# Patient Record
Sex: FEMALE | Race: WHITE | HISPANIC OR LATINO | URBAN - METROPOLITAN AREA
[De-identification: names, ages, dates, MRNs, and addresses within clinical notes are randomized per-mention and may not be internally consistent; named-entity substitution may affect disease eponyms.]

---

## 2021-01-20 ENCOUNTER — IMMUNIZATION (OUTPATIENT)
Dept: VACCINE CLINIC | Facility: HOSPITAL | Age: 56
End: 2021-01-20

## 2021-01-20 PROCEDURE — 91300 HC SARSCOV02 VAC 30MCG/0.3ML IM: CPT | Performed by: INTERNAL MEDICINE

## 2021-01-20 PROCEDURE — 0001A: CPT | Performed by: INTERNAL MEDICINE

## 2021-02-10 ENCOUNTER — IMMUNIZATION (OUTPATIENT)
Dept: VACCINE CLINIC | Facility: HOSPITAL | Age: 56
End: 2021-02-10

## 2021-02-10 PROCEDURE — 91300 HC SARSCOV02 VAC 30MCG/0.3ML IM: CPT | Performed by: INTERNAL MEDICINE

## 2021-02-10 PROCEDURE — 0002A: CPT | Performed by: INTERNAL MEDICINE

## 2023-05-08 ENCOUNTER — TRANSCRIBE ORDERS (OUTPATIENT)
Dept: ADMINISTRATIVE | Facility: HOSPITAL | Age: 58
End: 2023-05-08
Payer: OTHER GOVERNMENT

## 2023-05-08 DIAGNOSIS — K82.9 GALLBLADDER DISEASE: Primary | ICD-10-CM

## 2023-05-15 ENCOUNTER — HOSPITAL ENCOUNTER (OUTPATIENT)
Dept: NUCLEAR MEDICINE | Facility: HOSPITAL | Age: 58
Discharge: HOME | End: 2023-05-15
Payer: OTHER GOVERNMENT

## 2023-05-15 DIAGNOSIS — K82.9 GALLBLADDER DISEASE: ICD-10-CM

## 2023-05-15 PROCEDURE — 0 TECHNETIUM TC 99M MEBROFENIN KIT: Performed by: FAMILY MEDICINE

## 2023-05-15 PROCEDURE — A9537 TC99M MEBROFENIN: HCPCS | Performed by: FAMILY MEDICINE

## 2023-05-15 PROCEDURE — 78227 HEPATOBIL SYST IMAGE W/DRUG: CPT

## 2023-05-15 PROCEDURE — 25010000002 SINCALIDE PER 5 MCG: Performed by: FAMILY MEDICINE

## 2023-05-15 RX ORDER — KIT FOR THE PREPARATION OF TECHNETIUM TC 99M MEBROFENIN 45 MG/10ML
1 INJECTION, POWDER, LYOPHILIZED, FOR SOLUTION INTRAVENOUS
Status: COMPLETED | OUTPATIENT
Start: 2023-05-15 | End: 2023-05-15

## 2023-05-15 RX ADMIN — SODIUM CHLORIDE 1.6 MCG: 9 INJECTION, SOLUTION INTRAVENOUS at 11:31

## 2023-05-15 RX ADMIN — MEBROFENIN 1 DOSE: 45 INJECTION, POWDER, LYOPHILIZED, FOR SOLUTION INTRAVENOUS at 10:20

## 2024-07-08 ENCOUNTER — OFFICE VISIT (OUTPATIENT)
Dept: INTERNAL MEDICINE | Facility: CLINIC | Age: 59
End: 2024-07-08
Payer: OTHER GOVERNMENT

## 2024-07-08 VITALS
HEART RATE: 82 BPM | BODY MASS INDEX: 33.13 KG/M2 | HEIGHT: 62 IN | OXYGEN SATURATION: 98 % | TEMPERATURE: 97.8 F | DIASTOLIC BLOOD PRESSURE: 74 MMHG | WEIGHT: 180 LBS | SYSTOLIC BLOOD PRESSURE: 122 MMHG

## 2024-07-08 DIAGNOSIS — M77.12 LATERAL EPICONDYLITIS OF LEFT ELBOW: ICD-10-CM

## 2024-07-08 DIAGNOSIS — E11.9 TYPE 2 DIABETES MELLITUS WITHOUT COMPLICATION, WITHOUT LONG-TERM CURRENT USE OF INSULIN: Primary | ICD-10-CM

## 2024-07-08 DIAGNOSIS — E78.5 HYPERLIPIDEMIA, UNSPECIFIED HYPERLIPIDEMIA TYPE: ICD-10-CM

## 2024-07-08 DIAGNOSIS — Z76.89 ENCOUNTER TO ESTABLISH CARE: ICD-10-CM

## 2024-07-08 DIAGNOSIS — E66.9 OBESITY (BMI 30-39.9): ICD-10-CM

## 2024-07-08 LAB
EXPIRATION DATE: ABNORMAL
HBA1C MFR BLD: 8.3 % (ref 4.5–5.7)
Lab: ABNORMAL

## 2024-07-08 PROCEDURE — 83036 HEMOGLOBIN GLYCOSYLATED A1C: CPT | Performed by: NURSE PRACTITIONER

## 2024-07-08 PROCEDURE — 99203 OFFICE O/P NEW LOW 30 MIN: CPT | Performed by: NURSE PRACTITIONER

## 2024-07-08 RX ORDER — ORAL SEMAGLUTIDE 7 MG/1
7 TABLET ORAL DAILY
Qty: 30 TABLET | Refills: 3 | Status: SHIPPED | OUTPATIENT
Start: 2024-07-08

## 2024-07-08 RX ORDER — DULAGLUTIDE 0.75 MG/.5ML
0.75 INJECTION, SOLUTION SUBCUTANEOUS WEEKLY
COMMUNITY
Start: 2024-07-05 | End: 2024-07-08

## 2024-07-08 RX ORDER — ROSUVASTATIN CALCIUM 20 MG/1
20 TABLET, COATED ORAL DAILY
COMMUNITY
Start: 2023-08-16 | End: 2024-07-09 | Stop reason: SDUPTHER

## 2024-07-08 NOTE — PROGRESS NOTES
Chief Complaint  Establish Care, Diabetes (States that she is on trulicity for diabetes, this is the only medication that she takes as she should. Pt states that old provider had her on a Blood pressure medication as part of the diabetes protocol (?). States that this medication made her low pressure lower, and she couldn't take. This med doesn't pull from anywhere and pt did not know name. ), and Elbow Injury (States that she thinks this could be tennis elbow, states that this is the L. Arm near elbow and down to forearm. States this is x one month, maybe 2 months. Thinks from work or throwing Frisbee for dog. )    Subjective        Aparna Gonzalez presents to [facility]  History of Present Illness  Here to establish care.     She is a former patient of Dr. Laurie Ayala, with last office visit on 06/05/2023.     Type 2 DM: continues to take Trulicy, was taking lisinopril to help with kidneys but stopped taking it due to making her too dizzy. Last [id number]=02.1 on 05/12/2023. She is taking Trulicity 0.75 mg/mL SQ once per week. Today, her hemoglobin A1c=8.3. She does not like to inject herself, would rather have a pill form.     Hyperlipidemia: was taking rosuvastatin (CRESTOR) 20 mg once daily prescribed by historical provider, but not taking it now for unspecified reasons. Last fasting lipid panel from 05/12/2023 showed total cholesterol = 208, HDL = 53, LDL = 135, triglycerides 103. TSH normal. She has not been taking it because she did not know why it was prescribed.     Left elbow pain:  chronic for about 1-2 months. She is right-handed, but she does use her left arm for many things. She has tried a brace, does not help. She bought a TENS unit for about 2 weeks and is providing minimal relief. Cannot take NSAIDs due to s/p gastric bypass surgery.     Obesity: she is s/p gastric bypass surgery in 2014. Her diabetes was in remission,but cannot say when her blood glucose started to  "increase. Has lost about 150 lbs initially, but has gained back about 50 lbs.       Objective   Vital Signs:  /74 (BP Location: Right arm, Patient Position: Sitting, Cuff Size: Adult)   Pulse 82   Temp 97.8 °F (36.6 °C) (Infrared)   Ht 157.5 cm (62\")   Wt 81.6 kg (180 lb)   SpO2 98%   BMI 32.92 kg/m²   Estimated body mass index is 32.92 kg/m² as calculated from the following:    Height as of this encounter: 157.5 cm (62\").    Weight as of this encounter: 81.6 kg (180 lb).       BMI is >= 30 and <35. (Class 1 Obesity). The following options were offered after discussion;: exercise counseling/recommendations and nutrition counseling/recommendations       Physical Exam  Vitals reviewed.   Constitutional:       General: She is not in acute distress.     Appearance: Normal appearance. She is obese. She is not ill-appearing, toxic-appearing or diaphoretic.   Cardiovascular:      Rate and Rhythm: Normal rate and regular rhythm.   Pulmonary:      Effort: Pulmonary effort is normal.      Breath sounds: Normal breath sounds.   Musculoskeletal:        Arms:    Skin:     General: Skin is warm.   Neurological:      General: No focal deficit present.      Mental Status: She is alert and oriented to person, place, and time. Mental status is at baseline.   Psychiatric:         Mood and Affect: Mood normal.         Behavior: Behavior normal.         Thought Content: Thought content normal.         Judgment: Judgment normal.        Result Review :                   Assessment and Plan   Patient is a very pleasant 58-year-old female with type 2 diabetes mellitus not currently well-controlled, GERD, obesity status post gastric bypass surgery, hyperlipidemia here to establish care.    Results for orders placed or performed in visit on 07/08/24   POC Glycosylated Hemoglobin (Hb A1C)    Specimen: Blood   Result Value Ref Range    Hemoglobin A1C 8.3 (A) 4.5 - 5.7 %    Lot Number [id number]     Expiration Date 4,302,026  "           Diagnoses and all orders for this visit:    1. Type 2 diabetes mellitus without complication, without long-term current use of insulin (Primary)  Comments:  Change medication to oral semaglutide (Rybelsus) per patient preference  Orders:  -     POC Glycosylated Hemoglobin (Hb A1C)  -     Semaglutide (Rybelsus) 7 MG tablet; Take 7 mg by mouth Daily.  Dispense: 30 tablet; Refill: 3    2. Encounter to establish care    3. Hyperlipidemia, unspecified hyperlipidemia type  Comments:  Continue to take rosuvastatin (Crestor) 20 mg p.o. daily.  Will check lipid panel at next annual physical exam.    4. Lateral epicondylitis of left elbow  Comments:  Declines physical therapy at this time.  Continue TENS unit and brace area as much as possible. Follow up PRN if no improvement    5. Obesity (BMI 30-39.9)  Comments:  Recommend speaking with registered dietician. Will discuss further at annual physical exam.             Follow Up   Return in about 1 month (around 8/8/2024) for Annual physical.  Patient was given instructions and counseling regarding her condition or for health maintenance advice. Please see specific information pulled into the AVS if appropriate.

## 2024-07-09 RX ORDER — ROSUVASTATIN CALCIUM 20 MG/1
20 TABLET, COATED ORAL DAILY
Qty: 30 TABLET | Refills: 11 | Status: SHIPPED | OUTPATIENT
Start: 2024-07-09 | End: 2025-07-09

## 2024-07-09 NOTE — TELEPHONE ENCOUNTER
Caller: Aparna Gonzalez    Relationship: Self    Best call back number: [phone]    Requested Prescriptions:   Requested Prescriptions     Pending Prescriptions Disp Refills    rosuvastatin (CRESTOR) 20 MG tablet 30 tablet 11     Sig: Take 1 tablet by mouth Daily.        Pharm[facility] where request [facility] be sent: [id number] - [facility] - [address]  AT [address] - [phone]  - [phone] FX     Last office visit with prescribing clinician: 2024   Last telemedicine visit with prescribing clinician: Visit date not found   Next office visit with prescribing clinician: 2024     Additional details provided by patient: SHE HAS SOME OF THIS, BUT THINKS THAT HE MAY BE .     Does the patient have less than a 3 day supply:  [x] Yes  [] No        Mateo Menchaca Rep   24 08:34 EDT

## 2024-07-12 ENCOUNTER — PATIENT ROUNDING (BHMG ONLY) (OUTPATIENT)
Dept: INTERNAL MEDICINE | Facility: CLINIC | Age: 59
End: 2024-07-12
Payer: OTHER GOVERNMENT

## 2024-07-12 NOTE — PROGRESS NOTES
A My-Chart message has been sent to the patient for Patient Rounding with [facility].

## 2024-07-17 ENCOUNTER — TELEPHONE (OUTPATIENT)
Dept: INTERNAL MEDICINE | Facility: CLINIC | Age: 59
End: 2024-07-17

## 2024-07-17 NOTE — TELEPHONE ENCOUNTER
I do not show a glucose monitor in your medication list. At this time, it is not recommended you have a home glucose monitoring system unless you are on insulin. We will recheck your hemoglobin A1c in August.

## 2024-07-17 NOTE — TELEPHONE ENCOUNTER
Caller: LisaAparna    Relationship: Self    Best call back number: [phone]     What medication are you requesting: GLUCOSE MONITOR, TEST STRIPS, LANCETS      If a prescription is needed, what is your preferred pharmacy and phone number:    [id number] - [id number] - [address]  AT [place] [address] & [address] - [phone]  - [phone]  [phone]     Additional notes:    PATIENTS OLD GLUCOSE MONITOR FELL DOWN SOME STAIRS AND IS NOT WORKING. PATIENT WILL NEED A NEW ONE PLEASE. ALSO, THE TEST STRIPS AND LANCETS THAT GO WITH THE MONITOR.

## 2024-07-18 NOTE — TELEPHONE ENCOUNTER
Pt states she had  a glucometer (freestyle) it was started at [place],  not a CGM.  She dropped it down the stairs so she needs a new one.  Especially since startin Rybelsus.

## 2024-07-19 DIAGNOSIS — E11.9 TYPE 2 DIABETES MELLITUS WITHOUT COMPLICATION, WITHOUT LONG-TERM CURRENT USE OF INSULIN: Primary | ICD-10-CM

## 2024-07-23 NOTE — TELEPHONE ENCOUNTER
Patient calling today stating that since starting Rybelsus per Marycruz (she was on Trulicity) that she has not felt well since. States that she feels like it is her blood sugar but that she has no way to check, since she dropped her machine down the stairs.    I asked pt which machine she has, pt is not sure, and not able to look.      Pt states that she doesn't know which freestyle andi system that she had previously, but that she needs something she doesn't care what it is, as a replacement. She will need the whole kit (machine, strips, and lancets).     Please send in as able.

## 2024-07-24 NOTE — TELEPHONE ENCOUNTER
PATIENT CALLED BECAUSE SHE HAS BEEN TRYING TO GET A NEW MACHINE SINCE 7/17/2024 BUT SHE HASN'T HEARD BACK YET.     SHE FEARS HER BLOOD SUGAR MAY BE HIGH BUT SHE CAN'T READ IT.    SHE SAYS SHE HAS A FREESTYLE MACHINE

## 2024-07-25 ENCOUNTER — OFFICE VISIT (OUTPATIENT)
Dept: INTERNAL MEDICINE | Facility: CLINIC | Age: 59
End: 2024-07-25
Payer: OTHER GOVERNMENT

## 2024-07-25 VITALS
TEMPERATURE: 97.3 F | HEART RATE: 77 BPM | WEIGHT: 180 LBS | BODY MASS INDEX: 33.13 KG/M2 | DIASTOLIC BLOOD PRESSURE: 64 MMHG | HEIGHT: 62 IN | RESPIRATION RATE: 16 BRPM | SYSTOLIC BLOOD PRESSURE: 122 MMHG | OXYGEN SATURATION: 97 %

## 2024-07-25 DIAGNOSIS — R20.2 PARESTHESIA OF FOOT, BILATERAL: ICD-10-CM

## 2024-07-25 DIAGNOSIS — E11.9 TYPE 2 DIABETES MELLITUS WITHOUT COMPLICATION, WITHOUT LONG-TERM CURRENT USE OF INSULIN: Primary | ICD-10-CM

## 2024-07-25 DIAGNOSIS — R68.89 FEELING UNWELL: ICD-10-CM

## 2024-07-25 LAB — GLUCOSE BLDC GLUCOMTR-MCNC: 197 MG/DL (ref 70–130)

## 2024-07-25 PROCEDURE — 82948 REAGENT STRIP/BLOOD GLUCOSE: CPT | Performed by: NURSE PRACTITIONER

## 2024-07-25 PROCEDURE — 99214 OFFICE O/P EST MOD 30 MIN: CPT | Performed by: NURSE PRACTITIONER

## 2024-07-25 RX ORDER — ORAL SEMAGLUTIDE 14 MG/1
14 TABLET ORAL DAILY
Qty: 30 TABLET | Refills: 4 | Status: SHIPPED | OUTPATIENT
Start: 2024-07-25 | End: 2024-07-25

## 2024-07-25 RX ORDER — BLOOD-GLUCOSE METER
1 KIT MISCELLANEOUS AS NEEDED
Qty: 1 EACH | Refills: 0 | Status: SHIPPED | OUTPATIENT
Start: 2024-07-25

## 2024-07-25 NOTE — PROGRESS NOTES
Chief Complaint  Blood Sugar Problem (States that after she eats that she has sugar crashes and that she doesn't feel well. But hasn't been able to check with a broken machine. Pt feels she should be checking them, and feels maybe med is working too well or not well enough. )    Subjective        Aparna Gonzalez presents to [facility]  History of Present Illness  She was last seen in our office on July 08.2024 to establish care and for further evaluation diabetes mellitus.  At that last office visit, she stated that she was taking Trulicity and lisinopril to help with kidneys but stopped taking it due to making her too dizzy.  Her hemoglobin A1c in May 2003, which is the last time she saw her primary care provider, was 11.1%.  She is taking Trulicity 0.75 mg/mL SQ once per week.  At last office visit hemoglobin A1c was 8.3%.  She stated that she did not like to inject herself and requested to be switched to an oral form of semaglutide.    She was switched to semaglutide (Rybelsus) 7 mg to be taken by mouth once daily.  She was provided glucometer by historical provider specified reasons.  She accidentally broke the glucometer, does not know which one that she was prescribed.  She requested a new one.  However, because she is not on insulin, her insurance does not cover a glucometer.     She has not tolerated metformin in the past due to diarrhea. She cannot remember whether or not extended release, but probably not XL due to s/p bariatric surgery.     She is not feeling well, fatigued. Feels sick when she eats. No abdomen pain. This has been occurring 2 times in the past week and thinks may be due to Rybesus.   She is urinating well, bowel moves are normal pattern. No hematochezia or melena.   Diabetes  She has type 2 diabetes mellitus. No MedicAlert identification noted. The initial diagnosis of diabetes was made 2000 years ago. Pertinent negatives for hypoglycemia include no  "confusion, headaches, speech difficulty, sweats or tremors. Associated symptoms include foot paresthesias. Pertinent negatives for diabetes include no blurred vision, no foot ulcerations, no polydipsia, no polyuria and no weight loss. Hypoglycemia complications include required assistance. Pertinent negatives for hypoglycemia complications include no blackouts, no hospitalization, no nocturnal hypoglycemia and no required glucagon injection. She is compliant with treatment all of the time. She is following a generally healthy diet. Meal planning includes avoidance of concentrated sweets and low carbohydrate diet. She participates in exercise intermittently. She monitors blood glucose at home 1-2 x per day. Her highest blood glucose is 180-200 mg/dl. She does not see a podiatrist. Eye exam is current.   Blood Sugar Problem  Pertinent negatives include no headaches.       Objective   Vital Signs:  /64 (BP Location: Right arm, Patient Position: Sitting, Cuff Size: Adult)   Pulse 77   Temp 97.3 °F (36.3 °C) (Infrared)   Resp 16   Ht 157.5 cm (62\")   Wt 81.6 kg (180 lb)   SpO2 97%   BMI 32.92 kg/m²   Estimated body mass index is 32.92 kg/m² as calculated from the following:    Height as of this encounter: 157.5 cm (62\").    Weight as of this encounter: 81.6 kg (180 lb).               Physical Exam  Vitals reviewed.   Constitutional:       General: She is not in acute distress.     Appearance: Normal appearance. She is obese. She is not ill-appearing, toxic-appearing or diaphoretic.   Cardiovascular:      Rate and Rhythm: Normal rate and regular rhythm.   Pulmonary:      Effort: Pulmonary effort is normal.      Breath sounds: Normal breath sounds.   Neurological:      General: No focal deficit present.      Mental Status: She is alert and oriented to person, place, and time. Mental status is at baseline.        Result Review :                   Assessment and Plan   Patient is a pleasant 58-year-old female " with type 2 diabetes mellitus not currently well-controlled, GERD, obesity status post gastric bypass surgery, hyperlipidemia here for follow up initiating oral semaglutide (Rybelsus).         Diagnoses and all orders for this visit:    1. Type 2 diabetes mellitus without complication, without long-term current use of insulin (Primary)  -     POCT Glucose  -     Discontinue: Semaglutide (Rybelsus) 14 MG tablet; Take 1 tablet by mouth Daily.  Dispense: 30 tablet; Refill: 4  -     glucose monitor monitoring kit; Use 1 each As Needed (Use to check blood sugar up to two times daily). Please dispense covered glucometer with set of lancets and needles at a reasonable quantity.  Dispense: 1 each; Refill: 0  -     Ambulatory Referral to Endocrinology    2. Feeling unwell  -     CBC & Differential  -     Comprehensive Metabolic Panel  -     Lipid Panel  -     Thyroid Panel With TSH  -     Microalbumin / Creatinine Urine Ratio - Urine, Clean Catch  -     Vitamin D,25-Hydroxy  -     Vitamin B12    3. Paresthesia of foot, bilateral    Reviewed different treatment options for type 2 DM.  May not feel well due to staring 7 mg Rybelsus as opposed to starting 3 mg.  Does not wish to take SGLT2i due to concern over affecting kidneys.   She declines restarting Trulicity due to not wanting to inject herself.   She would like to continue Ryblesus while waiting to see endocrinology.   Will continue to monitor her response to Rybelsus prior to getting in to see endocrinology.        Follow Up   Return in about 3 months (around 10/25/2024) for Recheck.  Patient was given instructions and counseling regarding her condition or for health maintenance advice. Please see specific information pulled into the AVS if appropriate.         Answers submitted by the patient for this visit:  Primary Reason for Visit (Submitted on 7/25/2024)  What is the primary reason for your visit?: Diabetes  Diabetes Questionnaire (Submitted on 7/25/2024)  Chief  Complaint: Diabetes problem  Below 70: never

## 2024-07-26 LAB
25(OH)D3+25(OH)D2 SERPL-MCNC: 21.4 NG/ML (ref 30–100)
ALBUMIN SERPL-MCNC: 4.1 G/DL (ref 3.8–4.9)
ALBUMIN/CREAT UR: 6 MG/G CREAT (ref 0–29)
ALP SERPL-CCNC: 100 IU/L (ref 44–121)
ALT SERPL-CCNC: 21 IU/L (ref 0–32)
AST SERPL-CCNC: 18 IU/L (ref 0–40)
BASOPHILS # BLD AUTO: 0 X10E3/UL (ref 0–0.2)
BASOPHILS NFR BLD AUTO: 0 %
BILIRUB SERPL-MCNC: 0.3 MG/DL (ref 0–1.2)
BUN SERPL-MCNC: 17 MG/DL (ref 6–24)
BUN/CREAT SERPL: 23 (ref 9–23)
CALCIUM SERPL-MCNC: 9.2 MG/DL (ref 8.7–10.2)
CHLORIDE SERPL-SCNC: 101 MMOL/L (ref 96–106)
CHOLEST SERPL-MCNC: 145 MG/DL (ref 100–199)
CO2 SERPL-SCNC: 25 MMOL/L (ref 20–29)
CREAT SERPL-MCNC: 0.73 MG/DL (ref 0.57–1)
CREAT UR-MCNC: 265.6 MG/DL
EGFRCR SERPLBLD CKD-EPI 2021: 95 ML/MIN/1.73
EOSINOPHIL # BLD AUTO: 0.1 X10E3/UL (ref 0–0.4)
EOSINOPHIL NFR BLD AUTO: 1 %
ERYTHROCYTE [DISTWIDTH] IN BLOOD BY AUTOMATED COUNT: 12.7 % (ref 11.7–15.4)
FT4I SERPL CALC-MCNC: 2.1 (ref 1.2–4.9)
GLOBULIN SER CALC-MCNC: 2.9 G/DL (ref 1.5–4.5)
GLUCOSE SERPL-MCNC: 228 MG/DL (ref 70–99)
HCT VFR BLD AUTO: 38.1 % (ref 34–46.6)
HDLC SERPL-MCNC: 56 MG/DL
HGB BLD-MCNC: 12.4 G/DL (ref 11.1–15.9)
IMM GRANULOCYTES # BLD AUTO: 0 X10E3/UL (ref 0–0.1)
IMM GRANULOCYTES NFR BLD AUTO: 0 %
LDLC SERPL CALC-MCNC: 69 MG/DL (ref 0–99)
LYMPHOCYTES # BLD AUTO: 2.3 X10E3/UL (ref 0.7–3.1)
LYMPHOCYTES NFR BLD AUTO: 27 %
MCH RBC QN AUTO: 28.1 PG (ref 26.6–33)
MCHC RBC AUTO-ENTMCNC: 32.5 G/DL (ref 31.5–35.7)
MCV RBC AUTO: 86 FL (ref 79–97)
MICROALBUMIN UR-MCNC: 15.8 UG/ML
MONOCYTES # BLD AUTO: 0.6 X10E3/UL (ref 0.1–0.9)
MONOCYTES NFR BLD AUTO: 7 %
NEUTROPHILS # BLD AUTO: 5.5 X10E3/UL (ref 1.4–7)
NEUTROPHILS NFR BLD AUTO: 65 %
PLATELET # BLD AUTO: 283 X10E3/UL (ref 150–450)
POTASSIUM SERPL-SCNC: 4.4 MMOL/L (ref 3.5–5.2)
PROT SERPL-MCNC: 7 G/DL (ref 6–8.5)
RBC # BLD AUTO: 4.41 X10E6/UL (ref 3.77–5.28)
SODIUM SERPL-SCNC: 138 MMOL/L (ref 134–144)
T3RU NFR SERPL: 26 % (ref 24–39)
T4 SERPL-MCNC: 7.9 UG/DL (ref 4.5–12)
TRIGL SERPL-MCNC: 113 MG/DL (ref 0–149)
TSH SERPL DL<=0.005 MIU/L-ACNC: 2.23 UIU/ML (ref 0.45–4.5)
VIT B12 SERPL-MCNC: 232 PG/ML (ref 232–1245)
VLDLC SERPL CALC-MCNC: 20 MG/DL (ref 5–40)
WBC # BLD AUTO: 8.5 X10E3/UL (ref 3.4–10.8)

## 2024-08-07 ENCOUNTER — OFFICE VISIT (OUTPATIENT)
Dept: ENDOCRINOLOGY | Age: 59
End: 2024-08-07
Payer: OTHER GOVERNMENT

## 2024-08-07 VITALS
BODY MASS INDEX: 33.27 KG/M2 | SYSTOLIC BLOOD PRESSURE: 134 MMHG | DIASTOLIC BLOOD PRESSURE: 84 MMHG | HEART RATE: 89 BPM | HEIGHT: 62 IN | WEIGHT: 180.8 LBS | OXYGEN SATURATION: 97 %

## 2024-08-07 DIAGNOSIS — E11.9 TYPE 2 DIABETES MELLITUS WITHOUT COMPLICATION, WITHOUT LONG-TERM CURRENT USE OF INSULIN: Primary | ICD-10-CM

## 2024-08-07 RX ORDER — BLOOD GLUCOSE CONTROL HIGH,LOW
1 EACH MISCELLANEOUS ONCE
Qty: 1 EACH | Refills: 0 | Status: SHIPPED | OUTPATIENT
Start: 2024-08-07 | End: 2024-08-07

## 2024-08-07 RX ORDER — LANCETS
EACH MISCELLANEOUS
Qty: 100 EACH | Refills: 3 | Status: SHIPPED | OUTPATIENT
Start: 2024-08-07

## 2024-08-07 RX ORDER — BLOOD SUGAR DIAGNOSTIC
STRIP MISCELLANEOUS
Qty: 100 EACH | Refills: 3 | Status: SHIPPED | OUTPATIENT
Start: 2024-08-07

## 2024-08-07 RX ORDER — ORAL SEMAGLUTIDE 14 MG/1
TABLET ORAL
COMMUNITY
Start: 2024-07-24

## 2024-08-07 NOTE — PROGRESS NOTES
Referring provider: SALMA Johnson     Chief complaint/Reason for consult: T2DM    HPI:   - 58 year old female here for management of diabetes mellitus type 2  - Has had diabetes since early 2000's  - Had bariatric surgery several years ago  - No known complications to date  - Is currently taking Rybelsus 14 mg daily which was just increased  - She has not been checking her BG because she does not have testing supplies      The following portions of the patient's history were reviewed and updated as appropriate: allergies, current medications, past family history, past medical history, past social history, past surgical history, and problem list.      Objective     Vitals:    08/07/24 1154   BP: 134/84   Pulse: 89   SpO2: 97%        Physical Exam  Vitals reviewed.   Constitutional:       Appearance: Normal appearance.   Pulmonary:      Effort: Pulmonary effort is normal. No respiratory distress.   Neurological:      Mental Status: She is alert.   Psychiatric:         Mood and Affect: Mood normal.         Behavior: Behavior normal.         Thought Content: Thought content normal.         Judgment: Judgment normal.       Assessment & Plan   T2DM, uncontrolled due to hyperglycemia  - A1c in July was 8.3%  - On Rybelsus 14 mg daily  - Prescribed testing supplies and asked her to contact me with BG numbers    - Return to clinic in 3 months

## 2024-08-09 ENCOUNTER — PATIENT ROUNDING (BHMG ONLY) (OUTPATIENT)
Dept: ENDOCRINOLOGY | Age: 59
End: 2024-08-09
Payer: OTHER GOVERNMENT

## 2024-08-14 ENCOUNTER — OFFICE VISIT (OUTPATIENT)
Dept: INTERNAL MEDICINE | Facility: CLINIC | Age: 59
End: 2024-08-14
Payer: OTHER GOVERNMENT

## 2024-08-14 VITALS
RESPIRATION RATE: 16 BRPM | SYSTOLIC BLOOD PRESSURE: 118 MMHG | BODY MASS INDEX: 33.23 KG/M2 | WEIGHT: 180.6 LBS | TEMPERATURE: 95.9 F | HEART RATE: 70 BPM | DIASTOLIC BLOOD PRESSURE: 64 MMHG | OXYGEN SATURATION: 96 % | HEIGHT: 62 IN

## 2024-08-14 DIAGNOSIS — E66.9 OBESITY (BMI 30-39.9): ICD-10-CM

## 2024-08-14 DIAGNOSIS — R20.2 PARESTHESIA OF LEFT FOOT: ICD-10-CM

## 2024-08-14 DIAGNOSIS — E11.9 TYPE 2 DIABETES MELLITUS WITHOUT COMPLICATION, WITHOUT LONG-TERM CURRENT USE OF INSULIN: ICD-10-CM

## 2024-08-14 DIAGNOSIS — Z00.00 ROUTINE PHYSICAL EXAMINATION: Primary | ICD-10-CM

## 2024-08-14 PROCEDURE — 99396 PREV VISIT EST AGE 40-64: CPT | Performed by: NURSE PRACTITIONER

## 2024-08-14 NOTE — PROGRESS NOTES
"Chief Complaint  Annual Exam (Pt is fasting this am, last glucose reading this am was 197 mg/DL./)    Subjective        Aparna Gonzalez presents to [facility]  History of Present Illness  Type 2 diabetes mellitus: She recently became established with Dr. Yaniv Cortez, endocrinology.  Last office visit was August 7, 2024.  He advised her to stay on Rybelsus 14 mg p.o. daily and prescribe testing supplies with instructions to follow with him 3 months. She is still running in the 190's and 200's. She has not seen a registered dietician.       Last lab work was performed on July 25, 2024, including vitamin B12, vitamin D, microalbumin\creatinine, thyroid panel with TSH, lipid panel, CMP and CBC. Vitamin D level = 21.4 was recommended to take a daily vitamin D supplement 600 to 800 international units.    Obesity: She is status-post gastric bypass. She lost 150 lbs. She has gained back 50 lbs. Her lowest was 130 lbs after she had the gastric bypass surgery in [place] in 2014.  She walks daily, about 15,000 steps per day. Does not monitor her kcal input or output.       History is positive for unspecified kidney disease (mother), type 2 diabetes mellitus (father).  He is a former smoker with a pack year history 40.6 years.  She quit 1984.  She has never used tobacco products she drinks a minimal amount of alcohol.  She is status post hysterectomy.      Objective   Vital Signs:  /64 (BP Location: Left arm, Patient Position: Sitting, Cuff Size: Adult)   Pulse 70   Temp 95.9 °F (35.5 °C) (Infrared)   Resp 16   Ht 157.5 cm (62\")   Wt 81.9 kg (180 lb 9.6 oz)   SpO2 96%   BMI 33.03 kg/m²   Estimated body mass index is 33.03 kg/m² as calculated from the following:    Height as of this encounter: 157.5 cm (62\").    Weight as of this encounter: 81.9 kg (180 lb 9.6 oz).               Physical Exam  Vitals and nursing note reviewed.   Constitutional:       General: She is not in acute " distress.     Appearance: Normal appearance. She is obese. She is not ill-appearing, toxic-appearing or diaphoretic.   HENT:      Right Ear: Tympanic membrane, ear canal and external ear normal.      Left Ear: Tympanic membrane, ear canal and external ear normal.      Mouth/Throat:      Mouth: Mucous membranes are moist.      Pharynx: Oropharynx is clear.   Eyes:      Conjunctiva/sclera: Conjunctivae normal.      Pupils: Pupils are equal, round, and reactive to light.   Cardiovascular:      Rate and Rhythm: Normal rate and regular rhythm.      Pulses: Normal pulses.      Heart sounds: Normal heart sounds.   Pulmonary:      Effort: Pulmonary effort is normal.      Breath sounds: Normal breath sounds.   Abdominal:      General: Abdomen is flat. Bowel sounds are normal.      Palpations: Abdomen is soft.      Tenderness: There is no abdominal tenderness. There is no guarding.   Musculoskeletal:         General: Normal range of motion.   Feet:      Right foot:      Toenail Condition: Right toenails are normal.      Left foot:      Toenail Condition: Left toenails are normal.   Skin:     General: Skin is warm and dry.   Neurological:      General: No focal deficit present.      Mental Status: She is alert and oriented to person, place, and time. Mental status is at baseline.      Coordination: Coordination normal.      Gait: Gait normal.      Deep Tendon Reflexes: Reflexes normal.      Comments: Patient reports some mild paresthesia left great toe.    Psychiatric:         Mood and Affect: Mood normal.         Behavior: Behavior normal.         Thought Content: Thought content normal.         Judgment: Judgment normal.        Result Review :                   Assessment and Plan   Patient is a 58-year-old female with type 2 diabetes mellitus not currently well-controlled, GERD, obesity status post gastric bypass surgery, hyperlipidemia here for routine physical exam.        We discussed preventative care including age and  patient-appropriate immunizations and cancer screening. We also discussed the importance of exercise and a healthy diet. This is their annual preventative exam.        Diagnoses and all orders for this visit:    1. Routine physical examination (Primary)    2. Type 2 diabetes mellitus without complication, without long-term current use of insulin  Comments:  Keep appointment with Dr. Everton Cortez, endocrinology, 11/06/2024. Have diabetic foot exam performed on that date. Follow up with eyecare provider for eye exam.    3. Obesity (BMI 30-39.9)  Comments:  Discussed food diary. Advise to f/u with an RD with endocrinology for assistance managing diet.    4. Paresthesia of left foot  Comments:  Minor, acute. Recommend discussing with endocrinology and continue to monitor.             Follow Up   Return in about 6 months (around 2/14/2025) for Recheck.  Patient was given instructions and counseling regarding her condition or for health maintenance advice. Please see specific information pulled into the AVS if appropriate.

## 2024-09-26 DIAGNOSIS — E11.9 TYPE 2 DIABETES MELLITUS WITHOUT COMPLICATION, WITHOUT LONG-TERM CURRENT USE OF INSULIN: ICD-10-CM

## 2024-09-26 RX ORDER — ORAL SEMAGLUTIDE 14 MG/1
1 TABLET ORAL DAILY
Qty: 30 TABLET | Refills: 3 | Status: SHIPPED | OUTPATIENT
Start: 2024-09-26

## 2024-10-22 ENCOUNTER — OFFICE VISIT (OUTPATIENT)
Dept: INTERNAL MEDICINE | Facility: CLINIC | Age: 59
End: 2024-10-22
Payer: OTHER GOVERNMENT

## 2024-10-22 VITALS
BODY MASS INDEX: 32.94 KG/M2 | SYSTOLIC BLOOD PRESSURE: 118 MMHG | HEART RATE: 62 BPM | OXYGEN SATURATION: 96 % | HEIGHT: 62 IN | WEIGHT: 179 LBS | DIASTOLIC BLOOD PRESSURE: 74 MMHG

## 2024-10-22 DIAGNOSIS — M77.12 LATERAL EPICONDYLITIS OF LEFT ELBOW: Primary | ICD-10-CM

## 2024-10-22 PROCEDURE — 99213 OFFICE O/P EST LOW 20 MIN: CPT | Performed by: NURSE PRACTITIONER

## 2024-10-22 NOTE — PROGRESS NOTES
"Chief Complaint  Follow-up    Subjective        Aparna Gonzalez presents to [facility]  History of Present Illness  Was last seen in our office on August 14, 2024 for routine physical exam.    She cannot take NSAIDs. Has tried banding the forearm, and a TENS unit. This has been an issues for about 3 months. She is now having difficulty with her left . She has not been evaluated by sports medicine for this or had PT.       Objective   Vital Signs:  /74   Pulse 62   Ht 157.5 cm (62\")   Wt 81.2 kg (179 lb)   SpO2 96%   BMI 32.74 kg/m²   Estimated body mass index is 32.74 kg/m² as calculated from the following:    Height as of this encounter: 157.5 cm (62\").    Weight as of this encounter: 81.2 kg (179 lb).               Physical Exam  Vitals and nursing note reviewed.   Constitutional:       General: She is not in acute distress.     Appearance: Normal appearance. She is obese. She is not ill-appearing, toxic-appearing or diaphoretic.   Musculoskeletal:      Left elbow: Tenderness present in lateral epicondyle.      Left forearm: Tenderness present.      Left hand: Decreased strength (poor  strenght when compared with right hand.).   Neurological:      General: No focal deficit present.      Mental Status: She is alert and oriented to person, place, and time. Mental status is at baseline.        Result Review :                   Assessment and Plan   Patient is a 59-year-old female with type 2 diabetes mellitus not currently well-controlled, GERD, obesity status post gastric bypass surgery, hyperlipidemia who presents with about 3-month history left lateral epicondylitis not improving with conservative treatment.           Diagnoses and all orders for this visit:    1. Lateral epicondylitis of left elbow (Primary)  Comments:  Will refer to sports medicine for further evalution.  Orders:  -     Ambulatory Referral to Sports Medicine             Follow Up   Return if " symptoms worsen or fail to improve.  Patient was given instructions and counseling regarding her condition or for health maintenance advice. Please see specific information pulled into the AVS if appropriate.         Answers submitted by the patient for this visit:  Other (Submitted on 10/22/2024)  Please describe your symptoms.: General exam/ tennis elbow  Have you had these symptoms before?: Yes  How long have you been having these symptoms?: Greater than 2 weeks  Please list any medications you are currently taking for this condition.: Rybelsus  Primary Reason for Visit (Submitted on 10/22/2024)  What is the primary reason for your visit?: Problem Not Listed

## 2024-11-06 ENCOUNTER — OFFICE VISIT (OUTPATIENT)
Dept: ENDOCRINOLOGY | Age: 59
End: 2024-11-06
Payer: OTHER GOVERNMENT

## 2024-11-06 VITALS
OXYGEN SATURATION: 95 % | TEMPERATURE: 97.9 F | HEART RATE: 71 BPM | BODY MASS INDEX: 32.97 KG/M2 | SYSTOLIC BLOOD PRESSURE: 124 MMHG | WEIGHT: 179.2 LBS | DIASTOLIC BLOOD PRESSURE: 76 MMHG | HEIGHT: 62 IN

## 2024-11-06 DIAGNOSIS — E66.9 OBESITY (BMI 30-39.9): ICD-10-CM

## 2024-11-06 DIAGNOSIS — E11.9 TYPE 2 DIABETES MELLITUS WITHOUT COMPLICATION, WITHOUT LONG-TERM CURRENT USE OF INSULIN: Primary | ICD-10-CM

## 2024-11-06 LAB
ALBUMIN SERPL-MCNC: 4.1 G/DL (ref 3.5–5.2)
ALBUMIN/GLOB SERPL: 1.5 G/DL
ALP SERPL-CCNC: 86 U/L (ref 39–117)
ALT SERPL-CCNC: 18 U/L (ref 1–33)
AST SERPL-CCNC: 17 U/L (ref 1–32)
BILIRUB SERPL-MCNC: <0.2 MG/DL (ref 0–1.2)
BUN SERPL-MCNC: 18 MG/DL (ref 8–23)
BUN/CREAT SERPL: 26.1 (ref 7–25)
CALCIUM SERPL-MCNC: 9.1 MG/DL (ref 8.6–10.5)
CHLORIDE SERPL-SCNC: 103 MMOL/L (ref 98–107)
CO2 SERPL-SCNC: 27.9 MMOL/L (ref 22–29)
CREAT SERPL-MCNC: 0.69 MG/DL (ref 0.57–1)
EGFRCR SERPLBLD CKD-EPI 2021: 99.5 ML/MIN/1.73
GLOBULIN SER CALC-MCNC: 2.8 GM/DL
GLUCOSE SERPL-MCNC: 182 MG/DL (ref 65–99)
HBA1C MFR BLD: 8.9 % (ref 4.8–5.6)
POTASSIUM SERPL-SCNC: 4.4 MMOL/L (ref 3.5–5.2)
PROT SERPL-MCNC: 6.9 G/DL (ref 6–8.5)
SODIUM SERPL-SCNC: 138 MMOL/L (ref 136–145)

## 2024-11-06 RX ORDER — ORAL SEMAGLUTIDE 14 MG/1
1 TABLET ORAL DAILY
Qty: 90 TABLET | Refills: 3 | Status: SHIPPED | OUTPATIENT
Start: 2024-11-06

## 2024-11-06 NOTE — PROGRESS NOTES
Referring provider: No ref. provider found     Chief complaint/Reason for consult: T2DM    HPI:   - 59 year old female here for management of diabetes mellitus type 2  - Last seen in 8/2024  - Has had diabetes since early 2000's  - Had bariatric surgery several years ago  - No known complications to date  - Is currently taking Rybelsus 14 mg daily which was just increased      The following portions of the patient's history were reviewed and updated as appropriate: allergies, current medications, past family history, past medical history, past social history, past surgical history, and problem list.      Objective     Vitals:    11/06/24 0821   BP: 124/76   Pulse: 71   Temp: 97.9 °F (36.6 °C)   SpO2: 95%        Physical Exam  Vitals reviewed.   Constitutional:       Appearance: Normal appearance.   HENT:      Head: Normocephalic and atraumatic.   Eyes:      General: No scleral icterus.  Pulmonary:      Effort: Pulmonary effort is normal. No respiratory distress.   Neurological:      Mental Status: She is alert.      Gait: Gait normal.   Psychiatric:         Mood and Affect: Mood normal.         Behavior: Behavior normal.         Thought Content: Thought content normal.         Judgment: Judgment normal.         Assessment & Plan   T2DM, uncontrolled due to hyperglycemia  - Will check A1c  - On Rybelsus 14 mg daily    2. Obesity  - Dietary changes and exercise will help glycemic control       - Return to clinic in 4 months

## 2024-12-15 DIAGNOSIS — E11.9 TYPE 2 DIABETES MELLITUS WITHOUT COMPLICATION, WITHOUT LONG-TERM CURRENT USE OF INSULIN: ICD-10-CM

## 2024-12-16 RX ORDER — ORAL SEMAGLUTIDE 14 MG/1
1 TABLET ORAL DAILY
Qty: 90 TABLET | Refills: 3 | Status: SHIPPED | OUTPATIENT
Start: 2024-12-16

## 2024-12-16 NOTE — TELEPHONE ENCOUNTER
Rx Refill Note  Requested Prescriptions     Pending Prescriptions Disp Refills    Rybelsus 14 MG tablet [Pharmacy Med Name: RYBELSUS TABS 30'S 14MG] 90 tablet 3     Sig: TAKE 1 TABLET DAILY      Last office visit with prescribing clinician: 11/6/2024   Last telemedicine visit with prescribing clinician: Visit date not found   Next office visit with prescribing clinician: 3/17/2025                         Would you like a call back once the refill request has been completed: [] Yes [] No    If the office needs to give you a call back, can they leave a voicemail: [] Yes [] No    Sarah Loja MA  12/16/24, 07:49 EST

## 2024-12-22 DIAGNOSIS — E11.9 TYPE 2 DIABETES MELLITUS WITHOUT COMPLICATION, WITHOUT LONG-TERM CURRENT USE OF INSULIN: ICD-10-CM

## 2024-12-23 RX ORDER — ORAL SEMAGLUTIDE 14 MG/1
1 TABLET ORAL DAILY
Qty: 90 TABLET | Refills: 3 | OUTPATIENT
Start: 2024-12-23

## 2024-12-23 NOTE — TELEPHONE ENCOUNTER
Rx Refill Note  Requested Prescriptions     Pending Prescriptions Disp Refills    Rybelsus 14 MG tablet 90 tablet 3     Sig: Take 1 tablet by mouth Daily.      Last office visit with prescribing clinician: 11/6/2024   Last telemedicine visit with prescribing clinician: Visit date not found   Next office visit with prescribing clinician: 3/17/2025                         Would you like a call back once the refill request has been completed: [] Yes [] No    If the office needs to give you a call back, can they leave a voicemail: [] Yes [] No    Sarah Loja MA  12/23/24, 07:47 EST

## 2024-12-24 ENCOUNTER — PATIENT MESSAGE (OUTPATIENT)
Dept: ENDOCRINOLOGY | Age: 59
End: 2024-12-24
Payer: OTHER GOVERNMENT

## 2024-12-24 DIAGNOSIS — E11.9 TYPE 2 DIABETES MELLITUS WITHOUT COMPLICATION, WITHOUT LONG-TERM CURRENT USE OF INSULIN: ICD-10-CM

## 2024-12-26 ENCOUNTER — TELEPHONE (OUTPATIENT)
Dept: ENDOCRINOLOGY | Age: 59
End: 2024-12-26
Payer: OTHER GOVERNMENT

## 2024-12-26 RX ORDER — ROSUVASTATIN CALCIUM 20 MG/1
20 TABLET, COATED ORAL DAILY
Qty: 90 TABLET | Refills: 3 | Status: SHIPPED | OUTPATIENT
Start: 2024-12-26 | End: 2025-12-26

## 2024-12-26 NOTE — TELEPHONE ENCOUNTER
Rx Refill Note  Requested Prescriptions     Pending Prescriptions Disp Refills    rosuvastatin (CRESTOR) 20 MG tablet 30 tablet 11     Sig: Take 1 tablet by mouth Daily.    empagliflozin (Jardiance) 10 MG tablet tablet 90 tablet 1     Sig: Take 1 tablet by mouth Daily.      Last office visit with prescribing clinician: 11/6/2024   Last telemedicine visit with prescribing clinician: Visit date not found   Next office visit with prescribing clinician: 3/17/2025                         Would you like a call back once the refill request has been completed: [] Yes [] No    If the office needs to give you a call back, can they leave a voicemail: [] Yes [] No    Carmen Loja  12/26/24, 11:08 EST

## 2024-12-26 NOTE — TELEPHONE ENCOUNTER
Called and spoke with pt to let her know that I have faxed everything to [facility] for that PA. Pt then stated that she wanted me to call them because she only had 5 tablets left. I called and spoke with Kathy at [facility] and she stated that the Rybelsus had a lifetime approval and the pharmacy would need to call them to get a cost over ride. Called to inform pt. Pt voiced understanding and had no further questions.

## 2024-12-26 NOTE — TELEPHONE ENCOUNTER
Caller: Aparna Gonzalez    Relationship: Self    Best call back number: [phone]    What is the best time to reach you: ANYTIME, OKAY TO LVM    Who are you requesting to speak with (clinical staff, provider,  specific staff member): CLINICAL STAFF / PROVIDER     What was the call regarding: PT CALLED WONDERING STATUS OF PRIOR AUTH FOR PRESCRIPTION. PLEASE ADVISE.

## 2024-12-31 ENCOUNTER — TELEPHONE (OUTPATIENT)
Dept: ENDOCRINOLOGY | Age: 59
End: 2024-12-31
Payer: OTHER GOVERNMENT

## 2024-12-31 DIAGNOSIS — E11.9 TYPE 2 DIABETES MELLITUS WITHOUT COMPLICATION, WITHOUT LONG-TERM CURRENT USE OF INSULIN: ICD-10-CM

## 2024-12-31 RX ORDER — ORAL SEMAGLUTIDE 14 MG/1
1 TABLET ORAL DAILY
Qty: 90 TABLET | Refills: 3 | Status: SHIPPED | OUTPATIENT
Start: 2024-12-31

## 2024-12-31 RX ORDER — ORAL SEMAGLUTIDE 14 MG/1
1 TABLET ORAL DAILY
Qty: 90 TABLET | Refills: 3 | Status: CANCELLED | OUTPATIENT
Start: 2024-12-31

## 2024-12-31 NOTE — TELEPHONE ENCOUNTER
Caller: Lisa Aparna Ortiz    Relationship: Self    Best call back number: Rybelsus 14 MG tablet-     Requested Prescriptions: [phone]  Requested Prescriptions      No prescriptions requested or ordered in this encounter        Pharmacy where request should be sent:  [id number] - [place], [place] - [address]  AT [address] - [phone]  - [phone] [address]   [facility] [id number]      Last office visit with prescribing clinician: 11/6/2024   Last telemedicine visit with prescribing clinician: Visit date not found   Next office visit with prescribing clinician: 3/17/2025     Additional details provided by patient: Rybelsus 14 MG tablet- 30 SUPPLY [employer]OSPER [employer]. PT RUNS OUT OF MEDICATION TOMORROW    Does the patient have less than a 3 day supply:  [x] Yes  [] No    Would you like a call back once the refill request has been completed: [x] Yes [] No    If the office needs to give you a call back, can they leave a voicemail: [x] Yes [] No    Mtaeo Ivey Rep   12/31/24 08:24 EST

## 2025-02-18 ENCOUNTER — OFFICE VISIT (OUTPATIENT)
Dept: INTERNAL MEDICINE | Facility: CLINIC | Age: 60
End: 2025-02-18
Payer: OTHER GOVERNMENT

## 2025-02-18 VITALS
HEIGHT: 62 IN | OXYGEN SATURATION: 98 % | HEART RATE: 67 BPM | DIASTOLIC BLOOD PRESSURE: 60 MMHG | SYSTOLIC BLOOD PRESSURE: 110 MMHG | WEIGHT: 175 LBS | BODY MASS INDEX: 32.2 KG/M2

## 2025-02-18 DIAGNOSIS — E66.9 OBESITY (BMI 30-39.9): Primary | ICD-10-CM

## 2025-02-18 PROCEDURE — 99213 OFFICE O/P EST LOW 20 MIN: CPT | Performed by: NURSE PRACTITIONER

## 2025-02-18 NOTE — PROGRESS NOTES
"Chief Complaint  Obesity    Subjective        Aparna Gonzalez presents to [facility]  History of Present Illness  She was seen in our office on August 14, 2024 for routine physical exam.    BMI weight 180 pounds 12.9 ounces (82 kg).  Today, she weighs 175 pounds (79.4 kg).  This puts her at a BMI of 32.0 kg/m².She is status post gastric bypass.  At last office visit in August she did states she had lost 250 pounds.  However, she is gained about 50 pounds back.  Lowest weight was 130 pounds after she had the gastric surgery in [place] in 2014.  She states that she walks about 15,000 steps per day but does not monitor her kcal input or output.  Recommended that she keep a food diary and to follow-up with a registered dietitian with endocrinology for assistance in managing her diet.  She currently takes Rybelsus 14 mg p.o. daily as part of her type 2 diabetes mellitus regimen.    She feels good, continues to walk daily 15 k steps per day. No weight lifting. Has not spoken with a registered dietician.             Objective   Vital Signs:  /60   Pulse 67   Ht 157.5 cm (62.01\")   Wt 79.4 kg (175 lb)   SpO2 98%   BMI 32.00 kg/m²   Estimated body mass index is 32 kg/m² as calculated from the following:    Height as of this encounter: 157.5 cm (62.01\").    Weight as of this encounter: 79.4 kg (175 lb).               Physical Exam  Vitals and nursing note reviewed.   Constitutional:       General: She is not in acute distress.     Appearance: Normal appearance. She is obese. She is not ill-appearing, toxic-appearing or diaphoretic.   Cardiovascular:      Rate and Rhythm: Normal rate and regular rhythm.      Heart sounds: Normal heart sounds.   Pulmonary:      Effort: Pulmonary effort is normal.      Breath sounds: Normal breath sounds.   Neurological:      General: No focal deficit present.      Mental Status: She is alert and oriented to person, place, and time. Mental status is at " baseline.        Result Review :                   Assessment and Plan   Patient is a 59-year-old female with type 2 diabetes mellitus, GERD, obesity status post gastric bypass surgery, hyperlipidemia here for weight check.           Diagnoses and all orders for this visit:    1. Obesity (BMI 30-39.9) (Primary)  Comments:  Improving. Continue good healthy lifestyle. Will continue to montior progress.  Orders:  -     Ambulatory Referral to Nutrition Services      Will have her return in 6 months for routine physical.   Check fasting lipids.        Follow Up   Return in about 6 months (around 8/18/2025) for Annual physical.  Patient was given instructions and counseling regarding her condition or for health maintenance advice. Please see specific information pulled into the AVS if appropriate.

## 2025-03-17 ENCOUNTER — OFFICE VISIT (OUTPATIENT)
Dept: ENDOCRINOLOGY | Age: 60
End: 2025-03-17
Payer: OTHER GOVERNMENT

## 2025-03-17 VITALS
DIASTOLIC BLOOD PRESSURE: 84 MMHG | HEIGHT: 62 IN | OXYGEN SATURATION: 98 % | WEIGHT: 175.6 LBS | HEART RATE: 72 BPM | BODY MASS INDEX: 32.31 KG/M2 | SYSTOLIC BLOOD PRESSURE: 124 MMHG

## 2025-03-17 DIAGNOSIS — E66.9 OBESITY (BMI 30-39.9): ICD-10-CM

## 2025-03-17 DIAGNOSIS — E11.9 TYPE 2 DIABETES MELLITUS WITHOUT COMPLICATION, WITHOUT LONG-TERM CURRENT USE OF INSULIN: Primary | ICD-10-CM

## 2025-03-17 LAB
ALBUMIN SERPL-MCNC: 4.1 G/DL (ref 3.5–5.2)
ALBUMIN/GLOB SERPL: 1.6 G/DL
ALP SERPL-CCNC: 89 U/L (ref 39–117)
ALT SERPL-CCNC: 18 U/L (ref 1–33)
AST SERPL-CCNC: 19 U/L (ref 1–32)
BILIRUB SERPL-MCNC: 0.3 MG/DL (ref 0–1.2)
BUN SERPL-MCNC: 13 MG/DL (ref 8–23)
BUN/CREAT SERPL: 17.1 (ref 7–25)
CALCIUM SERPL-MCNC: 9 MG/DL (ref 8.6–10.5)
CHLORIDE SERPL-SCNC: 105 MMOL/L (ref 98–107)
CHOLEST SERPL-MCNC: 151 MG/DL (ref 0–200)
CO2 SERPL-SCNC: 25.9 MMOL/L (ref 22–29)
CREAT SERPL-MCNC: 0.76 MG/DL (ref 0.57–1)
EGFRCR SERPLBLD CKD-EPI 2021: 89.8 ML/MIN/1.73
GLOBULIN SER CALC-MCNC: 2.6 GM/DL
GLUCOSE SERPL-MCNC: 139 MG/DL (ref 65–99)
HBA1C MFR BLD: 7.8 % (ref 4.8–5.6)
HDLC SERPL-MCNC: 63 MG/DL (ref 40–60)
IMP & REVIEW OF LAB RESULTS: NORMAL
LDLC SERPL CALC-MCNC: 73 MG/DL (ref 0–100)
POTASSIUM SERPL-SCNC: 4.3 MMOL/L (ref 3.5–5.2)
PROT SERPL-MCNC: 6.7 G/DL (ref 6–8.5)
SODIUM SERPL-SCNC: 140 MMOL/L (ref 136–145)
TRIGL SERPL-MCNC: 79 MG/DL (ref 0–150)
VLDLC SERPL CALC-MCNC: 15 MG/DL (ref 5–40)

## 2025-03-17 PROCEDURE — 99214 OFFICE O/P EST MOD 30 MIN: CPT | Performed by: INTERNAL MEDICINE

## 2025-03-17 NOTE — PROGRESS NOTES
Chief complaint/Reason for consult:  T2DM    HPI:   - 59 year old female here for management of diabetes mellitus type 2  - Last seen in 11/2024  - Has had diabetes since early 2000's  - Had bariatric surgery several years ago  - No known complications to date  - Is currently taking Rybelsus 14 mg daily and Jardiance 10 mg daily    The following portions of the patient's history were reviewed and updated as appropriate: allergies, current medications, past family history, past medical history, past social history, past surgical history, and problem list.      Objective     Vitals:    03/17/25 0854   BP: 124/84   Pulse: 72   SpO2: 98%        Physical Exam  Vitals reviewed.   Constitutional:       Appearance: Normal appearance.   HENT:      Head: Normocephalic and atraumatic.   Eyes:      General: No scleral icterus.  Pulmonary:      Effort: Pulmonary effort is normal. No respiratory distress.   Neurological:      Mental Status: She is alert.      Gait: Gait normal.   Psychiatric:         Mood and Affect: Mood normal.         Behavior: Behavior normal.         Thought Content: Thought content normal.         Judgment: Judgment normal.     Assessment & Plan   T2DM, uncontrolled due to hyperglycemia  - Will check A1c  - Will switch from Rybelsus to Mounjaro if covered by insurance  - Cont. Jardiance 10 mg daily     2. Obesity  - Dietary changes and exercise will help glycemic control        - Return to clinic in 4 months